# Patient Record
Sex: FEMALE | Race: BLACK OR AFRICAN AMERICAN
[De-identification: names, ages, dates, MRNs, and addresses within clinical notes are randomized per-mention and may not be internally consistent; named-entity substitution may affect disease eponyms.]

---

## 2020-09-23 ENCOUNTER — DASHBOARD ENCOUNTERS (OUTPATIENT)
Age: 45
End: 2020-09-23

## 2020-09-23 ENCOUNTER — OFFICE VISIT (OUTPATIENT)
Dept: URBAN - METROPOLITAN AREA TELEHEALTH 2 | Facility: TELEHEALTH | Age: 45
End: 2020-09-23
Payer: COMMERCIAL

## 2020-09-23 ENCOUNTER — LAB OUTSIDE AN ENCOUNTER (OUTPATIENT)
Dept: URBAN - METROPOLITAN AREA TELEHEALTH 2 | Facility: TELEHEALTH | Age: 45
End: 2020-09-23

## 2020-09-23 DIAGNOSIS — K59.01 CONSTIPATION: ICD-10-CM

## 2020-09-23 PROBLEM — 88111009 ALTERED BOWEL FUNCTION: Status: ACTIVE | Noted: 2020-09-23

## 2020-09-23 PROBLEM — 14760008 CONSTIPATION: Status: ACTIVE | Noted: 2020-09-23

## 2020-09-23 PROCEDURE — 99213 OFFICE O/P EST LOW 20 MIN: CPT | Performed by: INTERNAL MEDICINE

## 2020-09-23 PROCEDURE — G9903 PT SCRN TBCO ID AS NON USER: HCPCS | Performed by: INTERNAL MEDICINE

## 2020-09-23 PROCEDURE — G8427 DOCREV CUR MEDS BY ELIG CLIN: HCPCS | Performed by: INTERNAL MEDICINE

## 2020-09-23 PROCEDURE — G8422 PT INELIG BMI CALCULATION: HCPCS | Performed by: INTERNAL MEDICINE

## 2020-09-23 PROCEDURE — 1036F TOBACCO NON-USER: CPT | Performed by: INTERNAL MEDICINE

## 2020-09-23 RX ORDER — DOCUSATE SODIUM 100 MG/1
CAPSULE ORAL
Qty: 0 | Refills: 0 | Status: ACTIVE | COMMUNITY
Start: 1900-01-01 | End: 1900-01-01

## 2020-09-23 RX ORDER — SUMATRIPTAN SUCCINATE 25 MG/1
TABLET, FILM COATED ORAL
Qty: 0 | Refills: 0 | Status: ACTIVE | COMMUNITY
Start: 1900-01-01 | End: 1900-01-01

## 2020-09-23 NOTE — HPI-TODAY'S VISIT:
ROUTINE VISIT, BECAUSE OF AGE, TO HAVE COLONOSCOPY, NOW AT 45 YEAR.  ALWAYS HAD BOWEL ISSUE. LOW CARB LOW SUGAAR DIET, TO LOOSE BM. DONT HAVE REGULAR BM IF FIBER SUPPLEMENT INTERFERE. IF STAY AWAY FROM DIET START GAINING. FEELS LIKE ADEQUATE EVAUCATION OF BM. NO BLEEDING. NO WT LOSS. NO ABDOMINAL PAIN. NO FHCC, OR POLYP. NO NAUSE OR VOMITING OR BLOATING.  DOES HAVE MIGRAINE AROUND PERIOD. NOT ANEMIA.  RIGHT NOW DOES HAVE BOWEL ISSUE. TRYIN TO LOOSE WT AND HAS BOWEL ISSUE.

## 2020-10-01 ENCOUNTER — TELEPHONE ENCOUNTER (OUTPATIENT)
Dept: URBAN - METROPOLITAN AREA CLINIC 115 | Facility: CLINIC | Age: 45
End: 2020-10-01